# Patient Record
Sex: MALE | Race: OTHER | Employment: FULL TIME | URBAN - METROPOLITAN AREA
[De-identification: names, ages, dates, MRNs, and addresses within clinical notes are randomized per-mention and may not be internally consistent; named-entity substitution may affect disease eponyms.]

---

## 2017-07-23 ENCOUNTER — APPOINTMENT (OUTPATIENT)
Dept: MRI IMAGING | Facility: HOSPITAL | Age: 27
End: 2017-07-23
Attending: EMERGENCY MEDICINE
Payer: MEDICAID

## 2017-07-23 ENCOUNTER — HOSPITAL ENCOUNTER (EMERGENCY)
Facility: HOSPITAL | Age: 27
Discharge: HOME | End: 2017-07-23
Attending: EMERGENCY MEDICINE
Payer: MEDICAID

## 2017-07-23 VITALS
HEART RATE: 66 BPM | OXYGEN SATURATION: 98 % | DIASTOLIC BLOOD PRESSURE: 55 MMHG | SYSTOLIC BLOOD PRESSURE: 99 MMHG | TEMPERATURE: 97 F | RESPIRATION RATE: 16 BRPM

## 2017-07-23 DIAGNOSIS — M54.9 MID BACK PAIN: Primary | ICD-10-CM

## 2017-07-23 LAB
ANION GAP SERPL CALC-SCNC: 5 MMOL/L (ref 0–18)
BASOPHILS # BLD: 0 K/UL (ref 0–0.2)
BASOPHILS NFR BLD: 1 %
BUN SERPL-MCNC: 12 MG/DL (ref 8–20)
BUN/CREAT SERPL: 14.5 (ref 10–20)
CALCIUM SERPL-MCNC: 9 MG/DL (ref 8.5–10.5)
CHLORIDE SERPL-SCNC: 105 MMOL/L (ref 95–110)
CO2 SERPL-SCNC: 29 MMOL/L (ref 22–32)
CREAT SERPL-MCNC: 0.83 MG/DL (ref 0.5–1.5)
EOSINOPHIL # BLD: 0.1 K/UL (ref 0–0.7)
EOSINOPHIL NFR BLD: 2 %
ERYTHROCYTE [DISTWIDTH] IN BLOOD BY AUTOMATED COUNT: 12.5 % (ref 11–15)
GLUCOSE SERPL-MCNC: 100 MG/DL (ref 70–99)
HCT VFR BLD AUTO: 40.1 % (ref 41–52)
HGB BLD-MCNC: 13.6 G/DL (ref 13.5–17.5)
LYMPHOCYTES # BLD: 2 K/UL (ref 1–4)
LYMPHOCYTES NFR BLD: 27 %
MCH RBC QN AUTO: 30.5 PG (ref 27–32)
MCHC RBC AUTO-ENTMCNC: 34 G/DL (ref 32–37)
MCV RBC AUTO: 89.8 FL (ref 80–100)
MONOCYTES # BLD: 0.5 K/UL (ref 0–1)
MONOCYTES NFR BLD: 6 %
NEUTROPHILS # BLD AUTO: 4.9 K/UL (ref 1.8–7.7)
NEUTROPHILS NFR BLD: 65 %
OSMOLALITY UR CALC.SUM OF ELEC: 288 MOSM/KG (ref 275–295)
PLATELET # BLD AUTO: 230 K/UL (ref 140–400)
PMV BLD AUTO: 8.9 FL (ref 7.4–10.3)
POTASSIUM SERPL-SCNC: 3.5 MMOL/L (ref 3.3–5.1)
RBC # BLD AUTO: 4.46 M/UL (ref 4.5–5.9)
SODIUM SERPL-SCNC: 139 MMOL/L (ref 136–144)
WBC # BLD AUTO: 7.6 K/UL (ref 4–11)

## 2017-07-23 PROCEDURE — 80048 BASIC METABOLIC PNL TOTAL CA: CPT | Performed by: EMERGENCY MEDICINE

## 2017-07-23 PROCEDURE — 85025 COMPLETE CBC W/AUTO DIFF WBC: CPT | Performed by: EMERGENCY MEDICINE

## 2017-07-23 PROCEDURE — A9575 INJ GADOTERATE MEGLUMI 0.1ML: HCPCS | Performed by: EMERGENCY MEDICINE

## 2017-07-23 PROCEDURE — 99284 EMERGENCY DEPT VISIT MOD MDM: CPT

## 2017-07-23 PROCEDURE — 36415 COLL VENOUS BLD VENIPUNCTURE: CPT

## 2017-07-23 PROCEDURE — 72157 MRI CHEST SPINE W/O & W/DYE: CPT | Performed by: EMERGENCY MEDICINE

## 2017-07-23 RX ORDER — ALBUTEROL SULFATE 2.5 MG/3ML
SOLUTION RESPIRATORY (INHALATION) EVERY 6 HOURS PRN
COMMUNITY
End: 2019-03-05

## 2017-07-23 RX ORDER — ACETAMINOPHEN 325 MG/1
650 TABLET ORAL ONCE
Status: COMPLETED | OUTPATIENT
Start: 2017-07-23 | End: 2017-07-23

## 2017-07-23 RX ORDER — CITALOPRAM 10 MG/1
10 TABLET ORAL DAILY
COMMUNITY
End: 2017-08-08 | Stop reason: DRUGHIGH

## 2017-07-23 NOTE — ED NOTES
Patient noticed a bump on his upper back around two weeks ago. It has been getting more painful in the last few days. He has a small raised area that is painful to the touch, and feels warmer than the skin around it.

## 2017-07-23 NOTE — ED PROVIDER NOTES
Patient Seen in: [facility] AND [facility] Emergency Department    History   Patient presents with:  Pain (neurologic)    Stated Complaint: Bump on back    HPI    81-year-old male presents for evaluation of a lump on his back.   Patient reports he has had a lump Eyes: Conjunctivae are normal.   Neck: Normal range of motion. Neck supple. Cardiovascular: Normal rate, regular rhythm and normal heart sounds. Pulmonary/Chest: Effort normal and breath sounds normal. No respiratory distress. Abdominal: Soft.  [place] Clinical Impression:  Mid back pain  (primary encounter diagnosis)    Disposition:  Discharge    Follow-up:  No follow-up provider specified.     Medications Prescribed:  Discharge Medication List as of 7/23/2017  8:57 AM

## 2017-08-08 RX ORDER — CITALOPRAM 40 MG/1
40 TABLET ORAL NIGHTLY
COMMUNITY
End: 2020-02-11 | Stop reason: ALTCHOICE

## 2017-08-11 ENCOUNTER — ANESTHESIA (OUTPATIENT)
Dept: SURGERY | Facility: HOSPITAL | Age: 27
End: 2017-08-11
Payer: MEDICAID

## 2017-08-11 ENCOUNTER — SURGERY (OUTPATIENT)
Age: 27
End: 2017-08-11

## 2017-08-11 ENCOUNTER — HOSPITAL ENCOUNTER (OUTPATIENT)
Facility: HOSPITAL | Age: 27
Setting detail: HOSPITAL OUTPATIENT SURGERY
Discharge: HOME | End: 2017-08-11
Attending: SURGERY | Admitting: SURGERY
Payer: MEDICAID

## 2017-08-11 ENCOUNTER — ANESTHESIA EVENT (OUTPATIENT)
Dept: SURGERY | Facility: HOSPITAL | Age: 27
End: 2017-08-11
Payer: MEDICAID

## 2017-08-11 VITALS
SYSTOLIC BLOOD PRESSURE: 99 MMHG | DIASTOLIC BLOOD PRESSURE: 60 MMHG | BODY MASS INDEX: 26.25 KG/M2 | RESPIRATION RATE: 16 BRPM | WEIGHT: 163.31 LBS | OXYGEN SATURATION: 100 % | TEMPERATURE: 97 F | HEART RATE: 64 BPM | HEIGHT: 66 IN

## 2017-08-11 PROCEDURE — 88305 TISSUE EXAM BY PATHOLOGIST: CPT | Performed by: SURGERY

## 2017-08-11 PROCEDURE — 0HB6XZZ EXCISION OF BACK SKIN, EXTERNAL APPROACH: ICD-10-PCS | Performed by: SURGERY

## 2017-08-11 PROCEDURE — 0HQ6XZZ REPAIR BACK SKIN, EXTERNAL APPROACH: ICD-10-PCS | Performed by: SURGERY

## 2017-08-11 RX ORDER — CLINDAMYCIN PHOSPHATE 900 MG/50ML
900 INJECTION INTRAVENOUS ONCE
Status: DISCONTINUED | OUTPATIENT
Start: 2017-08-11 | End: 2017-08-11 | Stop reason: HOSPADM

## 2017-08-11 RX ORDER — MORPHINE SULFATE 4 MG/ML
6 INJECTION, SOLUTION INTRAMUSCULAR; INTRAVENOUS EVERY 10 MIN PRN
Status: DISCONTINUED | OUTPATIENT
Start: 2017-08-11 | End: 2017-08-11

## 2017-08-11 RX ORDER — HYDROMORPHONE HYDROCHLORIDE 1 MG/ML
0.4 INJECTION, SOLUTION INTRAMUSCULAR; INTRAVENOUS; SUBCUTANEOUS EVERY 5 MIN PRN
Status: DISCONTINUED | OUTPATIENT
Start: 2017-08-11 | End: 2017-08-11

## 2017-08-11 RX ORDER — HYDROCODONE BITARTRATE AND ACETAMINOPHEN 7.5; 325 MG/1; MG/1
1 TABLET ORAL EVERY 4 HOURS PRN
Qty: 30 TABLET | Refills: 0 | Status: SHIPPED | OUTPATIENT
Start: 2017-08-11 | End: 2019-03-05

## 2017-08-11 RX ORDER — SODIUM CHLORIDE, SODIUM LACTATE, POTASSIUM CHLORIDE, CALCIUM CHLORIDE 600; 310; 30; 20 MG/100ML; MG/100ML; MG/100ML; MG/100ML
INJECTION, SOLUTION INTRAVENOUS CONTINUOUS
Status: DISCONTINUED | OUTPATIENT
Start: 2017-08-11 | End: 2017-08-11

## 2017-08-11 RX ORDER — LIDOCAINE HYDROCHLORIDE 10 MG/ML
INJECTION, SOLUTION EPIDURAL; INFILTRATION; INTRACAUDAL; PERINEURAL AS NEEDED
Status: DISCONTINUED | OUTPATIENT
Start: 2017-08-11 | End: 2017-08-11 | Stop reason: SURG

## 2017-08-11 RX ORDER — METOCLOPRAMIDE 10 MG/1
10 TABLET ORAL ONCE
Status: DISCONTINUED | OUTPATIENT
Start: 2017-08-11 | End: 2017-08-11 | Stop reason: HOSPADM

## 2017-08-11 RX ORDER — METOCLOPRAMIDE HYDROCHLORIDE 5 MG/ML
INJECTION INTRAMUSCULAR; INTRAVENOUS AS NEEDED
Status: DISCONTINUED | OUTPATIENT
Start: 2017-08-11 | End: 2017-08-11 | Stop reason: SURG

## 2017-08-11 RX ORDER — HYDROCODONE BITARTRATE AND ACETAMINOPHEN 5; 325 MG/1; MG/1
2 TABLET ORAL AS NEEDED
Status: COMPLETED | OUTPATIENT
Start: 2017-08-11 | End: 2017-08-11

## 2017-08-11 RX ORDER — MIDAZOLAM HYDROCHLORIDE 1 MG/ML
INJECTION INTRAMUSCULAR; INTRAVENOUS AS NEEDED
Status: DISCONTINUED | OUTPATIENT
Start: 2017-08-11 | End: 2017-08-11 | Stop reason: SURG

## 2017-08-11 RX ORDER — MORPHINE SULFATE 4 MG/ML
4 INJECTION, SOLUTION INTRAMUSCULAR; INTRAVENOUS EVERY 10 MIN PRN
Status: DISCONTINUED | OUTPATIENT
Start: 2017-08-11 | End: 2017-08-11

## 2017-08-11 RX ORDER — HYDROCODONE BITARTRATE AND ACETAMINOPHEN 5; 325 MG/1; MG/1
1 TABLET ORAL AS NEEDED
Status: COMPLETED | OUTPATIENT
Start: 2017-08-11 | End: 2017-08-11

## 2017-08-11 RX ORDER — DEXAMETHASONE SODIUM PHOSPHATE 4 MG/ML
VIAL (ML) INJECTION AS NEEDED
Status: DISCONTINUED | OUTPATIENT
Start: 2017-08-11 | End: 2017-08-11 | Stop reason: SURG

## 2017-08-11 RX ORDER — HYDROMORPHONE HYDROCHLORIDE 1 MG/ML
0.2 INJECTION, SOLUTION INTRAMUSCULAR; INTRAVENOUS; SUBCUTANEOUS EVERY 5 MIN PRN
Status: DISCONTINUED | OUTPATIENT
Start: 2017-08-11 | End: 2017-08-11

## 2017-08-11 RX ORDER — MORPHINE SULFATE 2 MG/ML
2 INJECTION, SOLUTION INTRAMUSCULAR; INTRAVENOUS EVERY 10 MIN PRN
Status: DISCONTINUED | OUTPATIENT
Start: 2017-08-11 | End: 2017-08-11

## 2017-08-11 RX ORDER — SODIUM CHLORIDE, SODIUM LACTATE, POTASSIUM CHLORIDE, CALCIUM CHLORIDE 600; 310; 30; 20 MG/100ML; MG/100ML; MG/100ML; MG/100ML
INJECTION, SOLUTION INTRAVENOUS CONTINUOUS PRN
Status: DISCONTINUED | OUTPATIENT
Start: 2017-08-11 | End: 2017-08-11 | Stop reason: SURG

## 2017-08-11 RX ORDER — FAMOTIDINE 20 MG/1
20 TABLET ORAL ONCE
Status: DISCONTINUED | OUTPATIENT
Start: 2017-08-11 | End: 2017-08-11 | Stop reason: HOSPADM

## 2017-08-11 RX ORDER — ACETAMINOPHEN 325 MG/1
650 TABLET ORAL ONCE
Status: COMPLETED | OUTPATIENT
Start: 2017-08-11 | End: 2017-08-11

## 2017-08-11 RX ORDER — ONDANSETRON 2 MG/ML
4 INJECTION INTRAMUSCULAR; INTRAVENOUS ONCE AS NEEDED
Status: DISCONTINUED | OUTPATIENT
Start: 2017-08-11 | End: 2017-08-11

## 2017-08-11 RX ORDER — ACETAMINOPHEN 325 MG/1
650 TABLET ORAL EVERY 4 HOURS PRN
Status: DISCONTINUED | OUTPATIENT
Start: 2017-08-11 | End: 2017-08-11

## 2017-08-11 RX ORDER — NALOXONE HYDROCHLORIDE 0.4 MG/ML
80 INJECTION, SOLUTION INTRAMUSCULAR; INTRAVENOUS; SUBCUTANEOUS AS NEEDED
Status: DISCONTINUED | OUTPATIENT
Start: 2017-08-11 | End: 2017-08-11

## 2017-08-11 RX ORDER — HYDROCODONE BITARTRATE AND ACETAMINOPHEN 7.5; 325 MG/1; MG/1
2 TABLET ORAL EVERY 4 HOURS PRN
Status: DISCONTINUED | OUTPATIENT
Start: 2017-08-11 | End: 2017-08-11

## 2017-08-11 RX ORDER — HYDROCODONE BITARTRATE AND ACETAMINOPHEN 7.5; 325 MG/1; MG/1
1 TABLET ORAL EVERY 4 HOURS PRN
Status: DISCONTINUED | OUTPATIENT
Start: 2017-08-11 | End: 2017-08-11

## 2017-08-11 RX ORDER — BUPIVACAINE HYDROCHLORIDE 2.5 MG/ML
INJECTION, SOLUTION EPIDURAL; INFILTRATION; INTRACAUDAL AS NEEDED
Status: DISCONTINUED | OUTPATIENT
Start: 2017-08-11 | End: 2017-08-11

## 2017-08-11 RX ORDER — HYDROMORPHONE HYDROCHLORIDE 1 MG/ML
0.6 INJECTION, SOLUTION INTRAMUSCULAR; INTRAVENOUS; SUBCUTANEOUS EVERY 5 MIN PRN
Status: DISCONTINUED | OUTPATIENT
Start: 2017-08-11 | End: 2017-08-11

## 2017-08-11 RX ADMIN — SODIUM CHLORIDE, SODIUM LACTATE, POTASSIUM CHLORIDE, CALCIUM CHLORIDE: 600; 310; 30; 20 INJECTION, SOLUTION INTRAVENOUS at 07:30:00

## 2017-08-11 RX ADMIN — METOCLOPRAMIDE HYDROCHLORIDE 10 MG: 5 INJECTION INTRAMUSCULAR; INTRAVENOUS at 07:34:00

## 2017-08-11 RX ADMIN — LIDOCAINE HYDROCHLORIDE 50 MG: 10 INJECTION, SOLUTION EPIDURAL; INFILTRATION; INTRACAUDAL; PERINEURAL at 07:38:00

## 2017-08-11 RX ADMIN — SODIUM CHLORIDE, SODIUM LACTATE, POTASSIUM CHLORIDE, CALCIUM CHLORIDE: 600; 310; 30; 20 INJECTION, SOLUTION INTRAVENOUS at 08:35:00

## 2017-08-11 RX ADMIN — DEXAMETHASONE SODIUM PHOSPHATE 4 MG: 4 MG/ML VIAL (ML) INJECTION at 07:34:00

## 2017-08-11 RX ADMIN — MIDAZOLAM HYDROCHLORIDE 2 MG: 1 INJECTION INTRAMUSCULAR; INTRAVENOUS at 07:34:00

## 2017-08-11 NOTE — OPERATIVE REPORT
[facility]    PATIENT'S NAME: Compa Ramos   ATTENDING PHYSICIAN: Myrna Neil. MD Nadya   OPERATING PHYSICIAN: Myrna Neil.  Norma Lara MD   PATIENT ACCOUNT#:   [id number]    LOCATION:  [address]  MEDICAL RECORD #:   [id number] 2-0 and 3-0 Vicryl and then 4-0 PDS for the skin. Dermabond applied. There were no complications. Needle count, sponge count, and instrument count were correct. Dictated By Carmencita Rosa.  Tyler Lynch MD  d: 08/11/2017 08:28:20  t: 08/11/2017 03:55:62  Ramakrishna Berry

## 2017-08-11 NOTE — H&P
[facility]    History and Physical    Marlyne Apley Patient Status:  Hospital Outpatient Surgery    10/12/1990 MRN [id number]   Location [facility] Attending Sherman Peter MD   Hosp Day # 0 PCP No primary 97.6 °F (36.4 °C), resp. rate 19, height 5' 6\" (1.676 m), weight 163 lb 4.8 oz (74.1 kg), SpO2 99 %. Physical Exam    Skin: 10 X 8 CM tender bulge on upper back, tender to palpation. No erythema or drainage.     Results:     Lab Results  Component Value D

## 2017-08-11 NOTE — ANESTHESIA PREPROCEDURE EVALUATION
Anesthesia PreOp Note    HPI:     Augusta Feng is a 23year old male who presents for preoperative consultation requested by: Jennyfer Silvestre MD    Date of Surgery: 8/11/2017    Procedure(s):  EXCISION LESION TORSO/BACK  Indication: Mass upper kymberly Social History Narrative   None on file       Available pre-op labs reviewed.     Lab Results  Component Value Date   WBC 7.6 07/23/2017   RBC 4.46 (L) 07/23/2017   HGB 13.6 07/23/2017   HCT 40.1 (L) 07/23/2017   MCV 89.8 07/23/2017   MCH 30.5 07/23/201

## 2017-08-11 NOTE — BRIEF OP NOTE
Pre-Operative Diagnosis: Mass upper back     Post-Operative Diagnosis: subfascial mass upper back     Procedure Performed:   Procedure(s):  Excision of mass of upper back    Surgeon(s) and Role:     * Mahsa Covington MD - Primary    Assistant(s):

## 2017-08-11 NOTE — ANESTHESIA POSTPROCEDURE EVALUATION
Patient: Salvatore Lu    Procedure Summary     Date:  08/11/17 Room / Location:  [address] [place] [address] MAIN OR 04 / 300 [facility] MAIN OR    Anesthesia Start:  7385 Anesthesia Stop:      Procedure:  EXCISION LESION TORSO/BACK (N/A ) Diagnosis:  (subfascial mass upper back)    Surge

## 2018-04-22 ENCOUNTER — HOSPITAL ENCOUNTER (EMERGENCY)
Facility: HOSPITAL | Age: 28
Discharge: HOME | End: 2018-04-23
Attending: EMERGENCY MEDICINE
Payer: MEDICAID

## 2018-04-22 ENCOUNTER — APPOINTMENT (OUTPATIENT)
Dept: GENERAL RADIOLOGY | Facility: HOSPITAL | Age: 28
End: 2018-04-22
Attending: EMERGENCY MEDICINE
Payer: MEDICAID

## 2018-04-22 DIAGNOSIS — J04.0 ACUTE LARYNGITIS: ICD-10-CM

## 2018-04-22 DIAGNOSIS — J40 BRONCHITIS: Primary | ICD-10-CM

## 2018-04-22 DIAGNOSIS — J98.01 ACUTE BRONCHOSPASM: ICD-10-CM

## 2018-04-22 PROCEDURE — 99284 EMERGENCY DEPT VISIT MOD MDM: CPT

## 2018-04-22 PROCEDURE — 71046 X-RAY EXAM CHEST 2 VIEWS: CPT | Performed by: EMERGENCY MEDICINE

## 2018-04-23 ENCOUNTER — APPOINTMENT (OUTPATIENT)
Dept: GENERAL RADIOLOGY | Facility: HOSPITAL | Age: 28
End: 2018-04-23
Attending: EMERGENCY MEDICINE
Payer: MEDICAID

## 2018-04-23 VITALS
TEMPERATURE: 97 F | SYSTOLIC BLOOD PRESSURE: 135 MMHG | DIASTOLIC BLOOD PRESSURE: 75 MMHG | RESPIRATION RATE: 20 BRPM | HEART RATE: 97 BPM | OXYGEN SATURATION: 99 %

## 2018-04-23 PROCEDURE — 94640 AIRWAY INHALATION TREATMENT: CPT

## 2018-04-23 PROCEDURE — 70360 X-RAY EXAM OF NECK: CPT | Performed by: EMERGENCY MEDICINE

## 2018-04-23 RX ORDER — DIPHENHYDRAMINE HCL 25 MG
50 CAPSULE ORAL ONCE
Status: COMPLETED | OUTPATIENT
Start: 2018-04-23 | End: 2018-04-23

## 2018-04-23 RX ORDER — ALBUTEROL SULFATE 2.5 MG/3ML
2.5 SOLUTION RESPIRATORY (INHALATION) ONCE
Status: COMPLETED | OUTPATIENT
Start: 2018-04-23 | End: 2018-04-23

## 2018-04-23 RX ORDER — DIPHENHYDRAMINE HCL 25 MG
50 TABLET ORAL EVERY 6 HOURS
Qty: 24 TABLET | Refills: 0 | Status: SHIPPED | OUTPATIENT
Start: 2018-04-23 | End: 2018-04-26

## 2018-04-23 RX ORDER — ALBUTEROL SULFATE 90 UG/1
2 AEROSOL, METERED RESPIRATORY (INHALATION) EVERY 4 HOURS PRN
Qty: 1 INHALER | Refills: 0 | Status: SHIPPED | OUTPATIENT
Start: 2018-04-23 | End: 2018-05-23

## 2018-04-23 RX ORDER — PREDNISONE 20 MG/1
40 TABLET ORAL DAILY
Qty: 6 TABLET | Refills: 0 | Status: SHIPPED | OUTPATIENT
Start: 2018-04-23 | End: 2018-04-26

## 2018-04-23 RX ORDER — PROMETHAZINE HYDROCHLORIDE 25 MG/1
TABLET ORAL
Status: COMPLETED
Start: 2018-04-23 | End: 2018-04-23

## 2018-04-23 RX ORDER — PREDNISONE 20 MG/1
60 TABLET ORAL ONCE
Status: COMPLETED | OUTPATIENT
Start: 2018-04-23 | End: 2018-04-23

## 2018-04-23 NOTE — ED PROVIDER NOTES
Patient Seen in: [facility] AND [facility] Emergency Department    History   Patient presents with:  Cough/URI    Stated Complaint: cough with sob     HPI    Relatively healthy 31-year-old male without lung history although he does have a history of bronchitis e lymphadenopathy. Cardiovascular: Normal rate, regular rhythm and intact distal pulses. No obvious murmur. Pulmonary/Chest: Effort normal. No respiratory distress. Mild diminished lung sounds both bases posteriorly but no obvious wheeze.   Abdominal: Sof Radiologist whose name is printed above. DD:  04/23/2018/DT:  04/23/2018       MDM     Patient feels a bit better. I would have given him a dose of oral Ativan or Valium as he does seem somewhat anxious with I think is contributing to his symptoms.   He

## 2018-05-20 ENCOUNTER — HOSPITAL ENCOUNTER (EMERGENCY)
Facility: HOSPITAL | Age: 28
Discharge: HOME | End: 2018-05-20
Attending: EMERGENCY MEDICINE
Payer: MEDICAID

## 2018-05-20 VITALS
TEMPERATURE: 98 F | RESPIRATION RATE: 16 BRPM | DIASTOLIC BLOOD PRESSURE: 75 MMHG | SYSTOLIC BLOOD PRESSURE: 110 MMHG | HEART RATE: 81 BPM | HEIGHT: 65 IN | OXYGEN SATURATION: 100 % | WEIGHT: 180 LBS | BODY MASS INDEX: 29.99 KG/M2

## 2018-05-20 DIAGNOSIS — R53.81 MALAISE: Primary | ICD-10-CM

## 2018-05-20 PROCEDURE — 93005 ELECTROCARDIOGRAM TRACING: CPT

## 2018-05-20 PROCEDURE — 85025 COMPLETE CBC W/AUTO DIFF WBC: CPT | Performed by: EMERGENCY MEDICINE

## 2018-05-20 PROCEDURE — 99284 EMERGENCY DEPT VISIT MOD MDM: CPT

## 2018-05-20 PROCEDURE — 82962 GLUCOSE BLOOD TEST: CPT

## 2018-05-20 PROCEDURE — 83735 ASSAY OF MAGNESIUM: CPT | Performed by: EMERGENCY MEDICINE

## 2018-05-20 PROCEDURE — 81003 URINALYSIS AUTO W/O SCOPE: CPT | Performed by: EMERGENCY MEDICINE

## 2018-05-20 PROCEDURE — 36415 COLL VENOUS BLD VENIPUNCTURE: CPT

## 2018-05-20 PROCEDURE — 80048 BASIC METABOLIC PNL TOTAL CA: CPT | Performed by: EMERGENCY MEDICINE

## 2018-05-20 PROCEDURE — 93010 ELECTROCARDIOGRAM REPORT: CPT | Performed by: EMERGENCY MEDICINE

## 2018-05-20 PROCEDURE — 80307 DRUG TEST PRSMV CHEM ANLYZR: CPT | Performed by: EMERGENCY MEDICINE

## 2018-05-20 RX ORDER — MELOXICAM 15 MG/1
15 TABLET ORAL DAILY
COMMUNITY
End: 2019-05-14

## 2018-05-20 NOTE — ED INITIAL ASSESSMENT (HPI)
Arrived with father and girlfriend for c/o generalized weakness. Pt is having a hard time keeping his eyes open in triage - states \"I just need to sleep. \" Skin is moist. c/o persistent cough (x4-6 weeks). Denies illicit drug use.

## 2018-05-20 NOTE — ED NOTES
Discharged home into care of family with plan to follow up with PCP as indicated. Alert and interactive. Hemodynamically stable.   Ambulates to exit with steady gait

## 2018-05-20 NOTE — ED PROVIDER NOTES
Patient Seen in: [facility] Emergency Department    History   Patient presents with:  Fatigue    Stated Complaint: syncope    HPI    Relatively healthy 32-year-old male takes meloxicam as needed as well as citalopram following Dr. Red Dixon who present mucous are moist.  No lesions. Posterior pharynx unremarkable. Neck: Normal range of motion. Neck supple. Numbness or meningismus. Cardiovascular: Normal rate, regular rhythm and intact and equal distal pulses. No murmur.   Pulmonary/Chest: Effort norm TALL (BNP)   CBC W/ DIFFERENTIAL     EKG    Rate, intervals and axes as noted on EKG Report.   Rate: 83  Rhythm: Sinus Rhythm  Reading: Grossly normal EKG without acute ischemia, normal intervals and axis           ED Course as of May 20 1621  -------------

## 2018-05-20 NOTE — ED NOTES
Care assumed from triage To room via [facility] with near syncopal episode and weakness Pt reports variable compliance with psychiatric meds/poor PO/poor sleep patterns Alert and interactive JAY No orthostatic changes NSR Respirations even nonlabored  Family at be

## 2019-03-05 ENCOUNTER — HOSPITAL ENCOUNTER (OUTPATIENT)
Dept: GENERAL RADIOLOGY | Age: 29
Discharge: HOME | End: 2019-03-05
Attending: NURSE PRACTITIONER
Payer: COMMERCIAL

## 2019-03-05 ENCOUNTER — OFFICE VISIT (OUTPATIENT)
Dept: FAMILY MEDICINE CLINIC | Facility: CLINIC | Age: 29
End: 2019-03-05
Payer: COMMERCIAL

## 2019-03-05 VITALS
WEIGHT: 179 LBS | BODY MASS INDEX: 28.77 KG/M2 | SYSTOLIC BLOOD PRESSURE: 108 MMHG | HEIGHT: 66 IN | HEART RATE: 79 BPM | DIASTOLIC BLOOD PRESSURE: 76 MMHG

## 2019-03-05 DIAGNOSIS — M72.2 PLANTAR FASCIAL FIBROMATOSIS OF BOTH FEET: ICD-10-CM

## 2019-03-05 DIAGNOSIS — M72.2 PLANTAR FASCIAL FIBROMATOSIS OF BOTH FEET: Primary | ICD-10-CM

## 2019-03-05 DIAGNOSIS — M21.41 BILATERAL PES PLANUS: ICD-10-CM

## 2019-03-05 DIAGNOSIS — M21.42 BILATERAL PES PLANUS: ICD-10-CM

## 2019-03-05 PROCEDURE — 99203 OFFICE O/P NEW LOW 30 MIN: CPT | Performed by: NURSE PRACTITIONER

## 2019-03-05 PROCEDURE — 73650 X-RAY EXAM OF HEEL: CPT | Performed by: NURSE PRACTITIONER

## 2019-03-05 RX ORDER — NAPROXEN 500 MG/1
500 TABLET ORAL 2 TIMES DAILY WITH MEALS
Qty: 60 TABLET | Refills: 0 | Status: SHIPPED | OUTPATIENT
Start: 2019-03-05 | End: 2020-02-11 | Stop reason: ALTCHOICE

## 2019-03-05 NOTE — PATIENT INSTRUCTIONS
Understanding Plantar Fasciitis    Plantar fasciitis is a condition that causes foot and heel pain. The plantar fascia is a tough band of tissue that runs across the bottom of the foot from the heel to the toes. This tissue pulls on the heel bone.  It sup · Using heel cups or foot inserts (orthotics). These are placed in the shoes to help support the heel or arch and cushion the heel. You may also be told to buy proper-fitting shoes with good arch support and cushioned soles. · Taping the foot.  This suppor First, your healthcare provider tries to find out the cause of your problem. He or she can then suggest ways to ease pain. If your pain is due to poor foot mechanics, occasionally custom-made shoe inserts (orthoses) may help.     Ease symptoms  · To relieve These instructions are for your right foot. Switch sides for your left foot. 1. Sit in a chair. Rest your right ankle on your left knee. 2. Hold your toes with your right hand. Gently bend the toes backward.  Feel a stretch in the undersides of the toes a

## 2019-03-05 NOTE — PROGRESS NOTES
HPI  Pt here for c/o bilat heel pain and leg pain for past year. Feels that due to pain, he is not walking correctly on feet. Pain is worses in am.   No known injury. Has some occasional foot swelling. Has been using some otc insoles.    Review of Systems Talks on phone: Not on file        Gets together: Not on file        Attends [employer] service: Not on file        Active member of club or organization: Not on file        Attends meetings of clubs or organizations: Not on file        Relationship s Refer podiatry           Relevant Medications    naproxen 500 MG Oral Tab    Other Relevant Orders    PODIATRY - INTERNAL    XR HEEL (CALCANEUS) (MIN 2 VIEWS), LEFT (CPT=73650)    XR HEEL (CALCANEUS) (MIN 2 VIEWS), RIGHT (CPT=73650)                  Discus

## 2019-03-25 ENCOUNTER — OFFICE VISIT (OUTPATIENT)
Dept: PODIATRY CLINIC | Facility: CLINIC | Age: 29
End: 2019-03-25
Payer: COMMERCIAL

## 2019-03-25 DIAGNOSIS — M72.2 PLANTAR FASCIITIS, BILATERAL: Primary | ICD-10-CM

## 2019-03-25 PROCEDURE — L3060 FOOT ARCH SUPP LONGITUD/META: HCPCS | Performed by: PODIATRIST

## 2019-03-25 PROCEDURE — 99243 OFF/OP CNSLTJ NEW/EST LOW 30: CPT | Performed by: PODIATRIST

## 2019-03-25 PROCEDURE — 99212 OFFICE O/P EST SF 10 MIN: CPT | Performed by: PODIATRIST

## 2019-03-25 NOTE — PROGRESS NOTES
HPI:    Patient ID: Yusuf An is a 33year old male. This pleasant 16-year-old male presents as a new patient to me on consult from [facility]. Patient's chief complaint is arch and heel pain in both feet this been present for as much as a year. his heels twice every evening for 15 minutes.   He is well-informed and indicates an understanding plan follow-up in 2 weeks           ASSESSMENT/PLAN:   Plantar fasciitis, bilateral  (primary encounter diagnosis)    No orders of the defined types were plac

## 2019-04-08 ENCOUNTER — OFFICE VISIT (OUTPATIENT)
Dept: PODIATRY CLINIC | Facility: CLINIC | Age: 29
End: 2019-04-08
Payer: COMMERCIAL

## 2019-04-08 DIAGNOSIS — M72.2 PLANTAR FASCIITIS, BILATERAL: Primary | ICD-10-CM

## 2019-04-08 PROCEDURE — 99212 OFFICE O/P EST SF 10 MIN: CPT | Performed by: PODIATRIST

## 2019-04-08 PROCEDURE — 99243 OFF/OP CNSLTJ NEW/EST LOW 30: CPT | Performed by: PODIATRIST

## 2019-04-08 RX ORDER — MELOXICAM 15 MG/1
15 TABLET ORAL
Qty: 30 TABLET | Refills: 1 | Status: SHIPPED | OUTPATIENT
Start: 2019-04-08 | End: 2020-02-11 | Stop reason: ALTCHOICE

## 2019-04-08 NOTE — PROGRESS NOTES
HPI:    Patient ID: Ligia Capellan is a 33year old male. This 67-year-old male presents for follow-up in reference to arch and heel pain. He reports minimal improvement.   Its apparent that he has been consistent taking the oral naproxen and icing and us 1 tablet (15 mg total) by mouth daily with dinner.        Imaging & Referrals:  None       #[id number]

## 2019-04-30 ENCOUNTER — OFFICE VISIT (OUTPATIENT)
Dept: PODIATRY CLINIC | Facility: CLINIC | Age: 29
End: 2019-04-30
Payer: COMMERCIAL

## 2019-04-30 DIAGNOSIS — M72.2 PLANTAR FASCIITIS, BILATERAL: Primary | ICD-10-CM

## 2019-04-30 PROCEDURE — 99212 OFFICE O/P EST SF 10 MIN: CPT | Performed by: PODIATRIST

## 2019-04-30 PROCEDURE — 99213 OFFICE O/P EST LOW 20 MIN: CPT | Performed by: PODIATRIST

## 2019-04-30 NOTE — PROGRESS NOTES
HPI:    Patient ID: Heddie Peabody is a 33year old male. 52-year-old male presents for follow-up and has noticed a dramatic and significant difference over the past couple of weeks.   This been a combination of the medication, icing, and most importantly

## 2019-05-14 ENCOUNTER — OFFICE VISIT (OUTPATIENT)
Dept: PODIATRY CLINIC | Facility: CLINIC | Age: 29
End: 2019-05-14
Payer: COMMERCIAL

## 2019-05-14 ENCOUNTER — TELEPHONE (OUTPATIENT)
Dept: PODIATRY CLINIC | Facility: CLINIC | Age: 29
End: 2019-05-14

## 2019-05-14 DIAGNOSIS — M72.2 PLANTAR FASCIITIS, BILATERAL: Primary | ICD-10-CM

## 2019-05-14 PROCEDURE — 29799 UNLISTED PX CASTING/STRPG: CPT | Performed by: PODIATRIST

## 2019-05-14 PROCEDURE — L3000 FT INSERT UCB BERKELEY SHELL: HCPCS | Performed by: PODIATRIST

## 2019-05-14 NOTE — PROGRESS NOTES
HPI:    Patient ID: Nick Olivas is a 33year old male. 32-year-old male presents for the purpose of long-term orthotic control. Approval has been received for this treatment.   An impression was taken of both of his feet the subtalar joint neutral posi

## 2019-06-26 NOTE — TELEPHONE ENCOUNTER
Called and left pt a VM to inform them orthotics are ready to be picked up. When pt calls back please schedule an appt with  at [facility] to do so. Thank You.

## 2019-08-21 ENCOUNTER — OFFICE VISIT (OUTPATIENT)
Dept: PODIATRY CLINIC | Facility: CLINIC | Age: 29
End: 2019-08-21
Payer: COMMERCIAL

## 2019-08-21 DIAGNOSIS — M72.2 PLANTAR FASCIITIS, BILATERAL: Primary | ICD-10-CM

## 2019-08-21 NOTE — PROGRESS NOTES
HPI:    Patient ID: Avery Jason is a 33year old male. This 45-year-old male presents for dispensing of orthoses. They appear to be of proper fit I do not anticipate areas of irritation. I discussed gradual break-in.   Plan follow-up in approximately

## 2019-10-07 ENCOUNTER — OFFICE VISIT (OUTPATIENT)
Dept: PODIATRY CLINIC | Facility: CLINIC | Age: 29
End: 2019-10-07
Payer: COMMERCIAL

## 2019-10-07 DIAGNOSIS — M72.2 PLANTAR FASCIITIS, BILATERAL: Primary | ICD-10-CM

## 2019-10-07 PROCEDURE — 99212 OFFICE O/P EST SF 10 MIN: CPT | Performed by: PODIATRIST

## 2019-10-07 NOTE — PROGRESS NOTES
HPI:    Patient ID: Laine Hardy is a 33year old male. 27-year-old male presents for orthotic evaluation. He is now been wearing them approximately 6 weeks. He states that he can wear them all day and finds them to be quite comfortable.   He is plea

## 2020-02-11 ENCOUNTER — OFFICE VISIT (OUTPATIENT)
Dept: FAMILY MEDICINE CLINIC | Facility: CLINIC | Age: 30
End: 2020-02-11
Payer: COMMERCIAL

## 2020-02-11 VITALS
OXYGEN SATURATION: 99 % | DIASTOLIC BLOOD PRESSURE: 78 MMHG | BODY MASS INDEX: 28.61 KG/M2 | TEMPERATURE: 98 F | SYSTOLIC BLOOD PRESSURE: 122 MMHG | WEIGHT: 178 LBS | HEART RATE: 84 BPM | HEIGHT: 66 IN | RESPIRATION RATE: 20 BRPM

## 2020-02-11 DIAGNOSIS — J11.1 INFLUENZA DUE TO UNIDENTIFIED INFLUENZA VIRUS WITH OTHER RESPIRATORY MANIFESTATIONS: Primary | ICD-10-CM

## 2020-02-11 LAB
POCT INFLUENZA A: NEGATIVE
POCT INFLUENZA B: NEGATIVE

## 2020-02-11 PROCEDURE — 99202 OFFICE O/P NEW SF 15 MIN: CPT | Performed by: NURSE PRACTITIONER

## 2020-02-11 PROCEDURE — 87502 INFLUENZA DNA AMP PROBE: CPT | Performed by: NURSE PRACTITIONER

## 2020-02-11 RX ORDER — OSELTAMIVIR PHOSPHATE 75 MG/1
75 CAPSULE ORAL 2 TIMES DAILY
Qty: 10 CAPSULE | Refills: 0 | Status: SHIPPED | OUTPATIENT
Start: 2020-02-11 | End: 2020-02-16

## 2020-02-11 NOTE — PROGRESS NOTES
CHIEF COMPLAINT:   Patient presents with:  Cough  Fever      HPI:   Yvonne De La Cruz is a 30year old male who presents for sudden onset of flu-like symptoms. Symptoms began 10 days ago. Patient reports body aches, chills,  dry cough.  Symptoms have been wor EYES: conjunctiva clear, EOM intact  EARS: TM's clear, no bulging, no retraction, no fluid, bony landmarks present  NOSE: Nostrils patent, clear nasal discharge, nasal mucosa red   THROAT: Oral mucosa pink, moist. Posterior pharynx is mildly erythematous. Many symptoms of Influenza/Flu. Flu is a virus, and not helped by antibiotics  The  antiviral Tamiflu can help shorten symptoms if started within 48 hrs of onset of symptoms. Discussed pros/cons/side effects of Tamiflu.      Consider OSCILLOCOCCINUM t Symptoms of the flu may be mild or severe. They can include extreme tiredness (wanting to stay in bed all day), chills, fevers, muscle aches, soreness with eye movement, headache, and a dry, hacking cough.   Home care  Follow these guidelines when caring fo · Severe weakness or dizziness  · You get a new fever or cough after getting better for a few days  Date Last Reviewed: 1/1/2017  © 5541-6133 [employer] [address]. [address] [facility], [address] [place] [address]. All rights reserved.  This information is not There are many types (strains) of flu viruses. Medical experts predict which strains are most likely to make people sick each year. This varies from year to year. Flu vaccines are made from these strains.  With the shot, inactivated flu viruses are injected For the 7964-8050 flu season, the flu vaccine is available in several forms . Most flu shots are given in an arm muscle with a needle. Talk with your healthcare provider about which form is best for you.   Flu shot  The shot is available in a few different

## 2020-02-11 NOTE — PATIENT INSTRUCTIONS
Many symptoms of Influenza/Flu. Flu is a virus, and not helped by antibiotics  The  antiviral Tamiflu can help shorten symptoms if started within 48 hrs of onset of symptoms. Discussed pros/cons/side effects of Tamiflu.      Consider OSCILLOCOCCINUM t fevers, muscle aches, soreness with eye movement, headache, and a dry, hacking cough. Home care  Follow these guidelines when caring for yourself at home:  · Avoid being around cigarette smoke, whether yours or other people’s.   · Acetaminophen or ibuprofe 3821-0997 [employer] [address] [address]. [address] [facility], [facility] [place]. All rights reserved. This information is not intended as a substitute for professional medical care. Always follow your healthcare professional's instructions.         Getting a strains later in the flu season, the antibodies will fight off the virus. Older adults don't make these antibodies as well as younger people do. So a special high-strength flu vaccine is given to people older than 65.  Your healthcare provider can tell you paralyzing condition. Nasal spray  A nasal spray made of live but weakened flu virus may also be given for the 8983-2162 flu season. This is for healthy non-pregnant people between ages 2 years and 52 years who don't get the flu shot.    Needle-free jet in

## (undated) DEVICE — SUTURE VICRYL 3-0 SH

## (undated) DEVICE — DRAPE SHEET LAPAROTOMY

## (undated) DEVICE — SUTURE VICRYL 3-0 J442H

## (undated) DEVICE — SOL IRRIG NACL 1000CC BTL .9%

## (undated) DEVICE — STERILE LATEX POWDER-FREE SURGICAL GLOVESWITH NITRILE COATING: Brand: PROTEXIS

## (undated) DEVICE — SUTURE VICRYL 2-0 CT-1

## (undated) DEVICE — SUTURE PDS II 4-0 PS-2

## (undated) DEVICE — MINOR GENERAL: Brand: MEDLINE INDUSTRIES, INC.

## (undated) DEVICE — DERMABOND LIQUID ADHESIVE

## (undated) DEVICE — 3M™ TEGADERM™ TRANSPARENT FILM DRESSING, 1626W, 4 IN X 4-3/4 IN (10 CM X 12 CM), 50 EACH/CARTON, 4 CARTON/CASE: Brand: 3M™ TEGADERM™

## (undated) DEVICE — GOWN SRG LG IMPRV BRTHBL STRL

## (undated) NOTE — ED AVS SNAPSHOT
[facility] Emergency Department  [address] [address]  Phone:  [phone]  Fax:  [phone]   MRN: [id number]    Department:  [facility] Emergency Department   Date of Visit:  7/23/2017 visiting [email or web address].    IF THERE IS ANY CHANGE OR WORSENING OF YOUR CONDITION, CALL YOUR PRIMARY CARE PHYSICIAN AT ONCE OR RETURN IMMEDIATELY TO THE EMERGENCY DEPARTMENT.     If you have been prescribed any medication(s), please fill your prescription

## (undated) NOTE — ED AVS SNAPSHOT
Yris Matthews   MRN: [id number]    Department:  [facility] Emergency Department   Date of Visit:  4/22/2018           Disclosure     Insurance plans vary and the physician(s) referred by the ER may not be covered by your plan.  Please contact y CARE PHYSICIAN AT ONCE OR RETURN IMMEDIATELY TO THE EMERGENCY DEPARTMENT. If you have been prescribed any medication(s), please fill your prescription right away and begin taking the medication(s) as directed.   If you believe that any of the medications

## (undated) NOTE — ED AVS SNAPSHOT
Jos Amaro   MRN: [id number]    Department:  [facility] Emergency Department   Date of Visit:  5/20/2018           Disclosure     Insurance plans vary and the physician(s) referred by the ER may not be covered by your plan.  Please contact y CARE PHYSICIAN AT ONCE OR RETURN IMMEDIATELY TO THE EMERGENCY DEPARTMENT. If you have been prescribed any medication(s), please fill your prescription right away and begin taking the medication(s) as directed.   If you believe that any of the medications